# Patient Record
Sex: FEMALE | Race: WHITE | NOT HISPANIC OR LATINO | Employment: FULL TIME | URBAN - METROPOLITAN AREA
[De-identification: names, ages, dates, MRNs, and addresses within clinical notes are randomized per-mention and may not be internally consistent; named-entity substitution may affect disease eponyms.]

---

## 2023-01-11 LAB
ABO (EXTERNAL): NORMAL
HEPATITIS B SURFACE ANTIGEN (EXTERNAL): NEGATIVE
HIV1+2 AB SERPL QL IA: NEGATIVE
PLATELET COUNT (EXTERNAL): 271 10E3/UL (ref 150–450)
RH (EXTERNAL): POSITIVE
RUBELLA ANTIBODY IGG (EXTERNAL): NORMAL
VDRL (SYPHILIS) (EXTERNAL): NONREACTIVE

## 2023-08-01 LAB — GROUP B STREPTOCOCCUS (EXTERNAL): NEGATIVE

## 2023-08-18 ENCOUNTER — HOSPITAL ENCOUNTER (INPATIENT)
Facility: CLINIC | Age: 33
LOS: 1 days | Discharge: HOME HEALTH | End: 2023-08-20
Attending: SPECIALIST | Admitting: SPECIALIST
Payer: COMMERCIAL

## 2023-08-18 RX ORDER — VITAMIN A ACETATE, .BETA.-CAROTENE, ASCORBIC ACID, CHOLECALCIFEROL, .ALPHA.-TOCOPHEROL ACETATE, DL-, THIAMINE MONONITRATE, RIBOFLAVIN, NIACINAMIDE, PYRIDOXINE HYDROCHLORIDE, FOLIC ACID, CYANOCOBALAMIN, CALCIUM CARBONATE, FERROUS FUMARATE, ZINC OXIDE, AND CUPRIC OXIDE 2000; 2000; 120; 400; 22; 1.84; 3; 20; 10; 1; 12; 200; 27; 25; 2 [IU]/1; [IU]/1; MG/1; [IU]/1; MG/1; MG/1; MG/1; MG/1; MG/1; MG/1; UG/1; MG/1; MG/1; MG/1; MG/1
1 TABLET ORAL DAILY
COMMUNITY

## 2023-08-18 ASSESSMENT — ACTIVITIES OF DAILY LIVING (ADL)
DOING_ERRANDS_INDEPENDENTLY_DIFFICULTY: NO
TOILETING_ISSUES: NO
CONCENTRATING,_REMEMBERING_OR_MAKING_DECISIONS_DIFFICULTY: NO
CHANGE_IN_FUNCTIONAL_STATUS_SINCE_ONSET_OF_CURRENT_ILLNESS/INJURY: NO
FALL_HISTORY_WITHIN_LAST_SIX_MONTHS: NO
WEAR_GLASSES_OR_BLIND: YES
DIFFICULTY_EATING/SWALLOWING: NO
DRESSING/BATHING_DIFFICULTY: NO
WALKING_OR_CLIMBING_STAIRS_DIFFICULTY: NO

## 2023-08-19 ENCOUNTER — ANESTHESIA (OUTPATIENT)
Dept: OBGYN | Facility: CLINIC | Age: 33
End: 2023-08-19
Payer: COMMERCIAL

## 2023-08-19 ENCOUNTER — ANESTHESIA EVENT (OUTPATIENT)
Dept: OBGYN | Facility: CLINIC | Age: 33
End: 2023-08-19
Payer: COMMERCIAL

## 2023-08-19 PROBLEM — O36.63X0 FETAL MACROSOMIA DURING PREGNANCY IN THIRD TRIMESTER: Status: ACTIVE | Noted: 2023-08-19

## 2023-08-19 LAB
ABO/RH(D): NORMAL
ANTIBODY SCREEN: NEGATIVE
HGB BLD-MCNC: 10.8 G/DL (ref 11.7–15.7)
HOLD SPECIMEN: NORMAL
HOLD SPECIMEN: NORMAL
SPECIMEN EXPIRATION DATE: NORMAL
T PALLIDUM AB SER QL: NONREACTIVE

## 2023-08-19 PROCEDURE — 86780 TREPONEMA PALLIDUM: CPT | Performed by: SPECIALIST

## 2023-08-19 PROCEDURE — 85018 HEMOGLOBIN: CPT | Performed by: SPECIALIST

## 2023-08-19 PROCEDURE — 36415 COLL VENOUS BLD VENIPUNCTURE: CPT | Performed by: SPECIALIST

## 2023-08-19 PROCEDURE — 86850 RBC ANTIBODY SCREEN: CPT | Performed by: SPECIALIST

## 2023-08-19 PROCEDURE — 0KQM0ZZ REPAIR PERINEUM MUSCLE, OPEN APPROACH: ICD-10-PCS | Performed by: SPECIALIST

## 2023-08-19 PROCEDURE — 0UQMXZZ REPAIR VULVA, EXTERNAL APPROACH: ICD-10-PCS | Performed by: SPECIALIST

## 2023-08-19 PROCEDURE — 00HU33Z INSERTION OF INFUSION DEVICE INTO SPINAL CANAL, PERCUTANEOUS APPROACH: ICD-10-PCS | Performed by: ANESTHESIOLOGY

## 2023-08-19 PROCEDURE — 250N000011 HC RX IP 250 OP 636: Performed by: ANESTHESIOLOGY

## 2023-08-19 PROCEDURE — 258N000003 HC RX IP 258 OP 636: Performed by: SPECIALIST

## 2023-08-19 PROCEDURE — 250N000013 HC RX MED GY IP 250 OP 250 PS 637: Performed by: SPECIALIST

## 2023-08-19 PROCEDURE — 370N000003 HC ANESTHESIA WARD SERVICE: Performed by: ANESTHESIOLOGY

## 2023-08-19 PROCEDURE — 3E0R3BZ INTRODUCTION OF ANESTHETIC AGENT INTO SPINAL CANAL, PERCUTANEOUS APPROACH: ICD-10-PCS | Performed by: ANESTHESIOLOGY

## 2023-08-19 PROCEDURE — 250N000011 HC RX IP 250 OP 636: Mod: JZ | Performed by: SPECIALIST

## 2023-08-19 PROCEDURE — 250N000009 HC RX 250: Performed by: SPECIALIST

## 2023-08-19 PROCEDURE — 722N000001 HC LABOR CARE VAGINAL DELIVERY SINGLE

## 2023-08-19 PROCEDURE — 250N000011 HC RX IP 250 OP 636: Mod: JZ | Performed by: ANESTHESIOLOGY

## 2023-08-19 PROCEDURE — 120N000012 HC R&B POSTPARTUM

## 2023-08-19 RX ORDER — OXYTOCIN 10 [USP'U]/ML
10 INJECTION, SOLUTION INTRAMUSCULAR; INTRAVENOUS
Status: DISCONTINUED | OUTPATIENT
Start: 2023-08-19 | End: 2023-08-19 | Stop reason: HOSPADM

## 2023-08-19 RX ORDER — METOCLOPRAMIDE 10 MG/1
10 TABLET ORAL EVERY 6 HOURS PRN
Status: DISCONTINUED | OUTPATIENT
Start: 2023-08-19 | End: 2023-08-19 | Stop reason: HOSPADM

## 2023-08-19 RX ORDER — KETOROLAC TROMETHAMINE 30 MG/ML
30 INJECTION, SOLUTION INTRAMUSCULAR; INTRAVENOUS
Status: DISCONTINUED | OUTPATIENT
Start: 2023-08-19 | End: 2023-08-19 | Stop reason: ALTCHOICE

## 2023-08-19 RX ORDER — FENTANYL CITRATE-0.9 % NACL/PF 10 MCG/ML
100 PLASTIC BAG, INJECTION (ML) INTRAVENOUS EVERY 5 MIN PRN
Status: DISCONTINUED | OUTPATIENT
Start: 2023-08-19 | End: 2023-08-19 | Stop reason: HOSPADM

## 2023-08-19 RX ORDER — IBUPROFEN 400 MG/1
800 TABLET, FILM COATED ORAL
Status: DISCONTINUED | OUTPATIENT
Start: 2023-08-19 | End: 2023-08-19 | Stop reason: ALTCHOICE

## 2023-08-19 RX ORDER — MISOPROSTOL 200 UG/1
800 TABLET ORAL
Status: DISCONTINUED | OUTPATIENT
Start: 2023-08-19 | End: 2023-08-20 | Stop reason: HOSPADM

## 2023-08-19 RX ORDER — LIDOCAINE 40 MG/G
CREAM TOPICAL
Status: DISCONTINUED | OUTPATIENT
Start: 2023-08-19 | End: 2023-08-19 | Stop reason: HOSPADM

## 2023-08-19 RX ORDER — SODIUM CHLORIDE, SODIUM LACTATE, POTASSIUM CHLORIDE, CALCIUM CHLORIDE 600; 310; 30; 20 MG/100ML; MG/100ML; MG/100ML; MG/100ML
INJECTION, SOLUTION INTRAVENOUS CONTINUOUS
Status: DISCONTINUED | OUTPATIENT
Start: 2023-08-19 | End: 2023-08-19 | Stop reason: HOSPADM

## 2023-08-19 RX ORDER — NALOXONE HYDROCHLORIDE 0.4 MG/ML
0.2 INJECTION, SOLUTION INTRAMUSCULAR; INTRAVENOUS; SUBCUTANEOUS
Status: DISCONTINUED | OUTPATIENT
Start: 2023-08-19 | End: 2023-08-19 | Stop reason: HOSPADM

## 2023-08-19 RX ORDER — OXYTOCIN/0.9 % SODIUM CHLORIDE 30/500 ML
100-340 PLASTIC BAG, INJECTION (ML) INTRAVENOUS CONTINUOUS PRN
Status: DISCONTINUED | OUTPATIENT
Start: 2023-08-19 | End: 2023-08-20 | Stop reason: HOSPADM

## 2023-08-19 RX ORDER — CARBOPROST TROMETHAMINE 250 UG/ML
250 INJECTION, SOLUTION INTRAMUSCULAR
Status: DISCONTINUED | OUTPATIENT
Start: 2023-08-19 | End: 2023-08-19 | Stop reason: HOSPADM

## 2023-08-19 RX ORDER — BISACODYL 10 MG
10 SUPPOSITORY, RECTAL RECTAL DAILY PRN
Status: DISCONTINUED | OUTPATIENT
Start: 2023-08-19 | End: 2023-08-20 | Stop reason: HOSPADM

## 2023-08-19 RX ORDER — METOCLOPRAMIDE HYDROCHLORIDE 5 MG/ML
10 INJECTION INTRAMUSCULAR; INTRAVENOUS EVERY 6 HOURS PRN
Status: DISCONTINUED | OUTPATIENT
Start: 2023-08-19 | End: 2023-08-19 | Stop reason: HOSPADM

## 2023-08-19 RX ORDER — METHYLERGONOVINE MALEATE 0.2 MG/ML
200 INJECTION INTRAVENOUS
Status: DISCONTINUED | OUTPATIENT
Start: 2023-08-19 | End: 2023-08-19 | Stop reason: HOSPADM

## 2023-08-19 RX ORDER — OXYTOCIN 10 [USP'U]/ML
10 INJECTION, SOLUTION INTRAMUSCULAR; INTRAVENOUS
Status: DISCONTINUED | OUTPATIENT
Start: 2023-08-19 | End: 2023-08-20 | Stop reason: HOSPADM

## 2023-08-19 RX ORDER — PROCHLORPERAZINE MALEATE 5 MG
10 TABLET ORAL EVERY 6 HOURS PRN
Status: DISCONTINUED | OUTPATIENT
Start: 2023-08-19 | End: 2023-08-19 | Stop reason: HOSPADM

## 2023-08-19 RX ORDER — NALBUPHINE HYDROCHLORIDE 20 MG/ML
2.5-5 INJECTION, SOLUTION INTRAMUSCULAR; INTRAVENOUS; SUBCUTANEOUS EVERY 6 HOURS PRN
Status: DISCONTINUED | OUTPATIENT
Start: 2023-08-19 | End: 2023-08-20 | Stop reason: HOSPADM

## 2023-08-19 RX ORDER — ROPIVACAINE HYDROCHLORIDE 2 MG/ML
10 INJECTION, SOLUTION EPIDURAL; INFILTRATION; PERINEURAL ONCE
Status: DISCONTINUED | OUTPATIENT
Start: 2023-08-19 | End: 2023-08-19 | Stop reason: HOSPADM

## 2023-08-19 RX ORDER — TRANEXAMIC ACID 10 MG/ML
1 INJECTION, SOLUTION INTRAVENOUS EVERY 30 MIN PRN
Status: DISCONTINUED | OUTPATIENT
Start: 2023-08-19 | End: 2023-08-20 | Stop reason: HOSPADM

## 2023-08-19 RX ORDER — MODIFIED LANOLIN
OINTMENT (GRAM) TOPICAL
Status: DISCONTINUED | OUTPATIENT
Start: 2023-08-19 | End: 2023-08-20 | Stop reason: HOSPADM

## 2023-08-19 RX ORDER — OXYTOCIN/0.9 % SODIUM CHLORIDE 30/500 ML
340 PLASTIC BAG, INJECTION (ML) INTRAVENOUS CONTINUOUS PRN
Status: DISCONTINUED | OUTPATIENT
Start: 2023-08-19 | End: 2023-08-20 | Stop reason: HOSPADM

## 2023-08-19 RX ORDER — NALOXONE HYDROCHLORIDE 0.4 MG/ML
0.4 INJECTION, SOLUTION INTRAMUSCULAR; INTRAVENOUS; SUBCUTANEOUS
Status: DISCONTINUED | OUTPATIENT
Start: 2023-08-19 | End: 2023-08-19 | Stop reason: HOSPADM

## 2023-08-19 RX ORDER — MISOPROSTOL 200 UG/1
400 TABLET ORAL
Status: DISCONTINUED | OUTPATIENT
Start: 2023-08-19 | End: 2023-08-20 | Stop reason: HOSPADM

## 2023-08-19 RX ORDER — CARBOPROST TROMETHAMINE 250 UG/ML
250 INJECTION, SOLUTION INTRAMUSCULAR
Status: DISCONTINUED | OUTPATIENT
Start: 2023-08-19 | End: 2023-08-20 | Stop reason: HOSPADM

## 2023-08-19 RX ORDER — PROCHLORPERAZINE 25 MG
25 SUPPOSITORY, RECTAL RECTAL EVERY 12 HOURS PRN
Status: DISCONTINUED | OUTPATIENT
Start: 2023-08-19 | End: 2023-08-19 | Stop reason: HOSPADM

## 2023-08-19 RX ORDER — ONDANSETRON 2 MG/ML
4 INJECTION INTRAMUSCULAR; INTRAVENOUS EVERY 6 HOURS PRN
Status: DISCONTINUED | OUTPATIENT
Start: 2023-08-19 | End: 2023-08-19 | Stop reason: HOSPADM

## 2023-08-19 RX ORDER — MISOPROSTOL 200 UG/1
800 TABLET ORAL
Status: DISCONTINUED | OUTPATIENT
Start: 2023-08-19 | End: 2023-08-19 | Stop reason: HOSPADM

## 2023-08-19 RX ORDER — CITRIC ACID/SODIUM CITRATE 334-500MG
30 SOLUTION, ORAL ORAL
Status: DISCONTINUED | OUTPATIENT
Start: 2023-08-19 | End: 2023-08-19 | Stop reason: HOSPADM

## 2023-08-19 RX ORDER — ONDANSETRON 2 MG/ML
4 INJECTION INTRAMUSCULAR; INTRAVENOUS EVERY 6 HOURS PRN
Status: DISCONTINUED | OUTPATIENT
Start: 2023-08-19 | End: 2023-08-19

## 2023-08-19 RX ORDER — DOCUSATE SODIUM 100 MG/1
100 CAPSULE, LIQUID FILLED ORAL DAILY
Status: DISCONTINUED | OUTPATIENT
Start: 2023-08-19 | End: 2023-08-20 | Stop reason: HOSPADM

## 2023-08-19 RX ORDER — METHYLERGONOVINE MALEATE 0.2 MG/ML
200 INJECTION INTRAVENOUS
Status: DISCONTINUED | OUTPATIENT
Start: 2023-08-19 | End: 2023-08-20 | Stop reason: HOSPADM

## 2023-08-19 RX ORDER — ROPIVACAINE HYDROCHLORIDE 2 MG/ML
INJECTION, SOLUTION EPIDURAL; INFILTRATION; PERINEURAL
Status: COMPLETED | OUTPATIENT
Start: 2023-08-19 | End: 2023-08-19

## 2023-08-19 RX ORDER — IBUPROFEN 400 MG/1
800 TABLET, FILM COATED ORAL EVERY 6 HOURS PRN
Status: DISCONTINUED | OUTPATIENT
Start: 2023-08-19 | End: 2023-08-20 | Stop reason: HOSPADM

## 2023-08-19 RX ORDER — HYDROCORTISONE 25 MG/G
CREAM TOPICAL 3 TIMES DAILY PRN
Status: DISCONTINUED | OUTPATIENT
Start: 2023-08-19 | End: 2023-08-20 | Stop reason: HOSPADM

## 2023-08-19 RX ORDER — ACETAMINOPHEN 325 MG/1
650 TABLET ORAL EVERY 4 HOURS PRN
Status: DISCONTINUED | OUTPATIENT
Start: 2023-08-19 | End: 2023-08-19 | Stop reason: HOSPADM

## 2023-08-19 RX ORDER — FENTANYL CITRATE 50 UG/ML
50 INJECTION, SOLUTION INTRAMUSCULAR; INTRAVENOUS EVERY 30 MIN PRN
Status: DISCONTINUED | OUTPATIENT
Start: 2023-08-19 | End: 2023-08-19 | Stop reason: HOSPADM

## 2023-08-19 RX ORDER — ACETAMINOPHEN 325 MG/1
650 TABLET ORAL EVERY 4 HOURS PRN
Status: DISCONTINUED | OUTPATIENT
Start: 2023-08-19 | End: 2023-08-20 | Stop reason: HOSPADM

## 2023-08-19 RX ORDER — OXYTOCIN/0.9 % SODIUM CHLORIDE 30/500 ML
340 PLASTIC BAG, INJECTION (ML) INTRAVENOUS CONTINUOUS PRN
Status: DISCONTINUED | OUTPATIENT
Start: 2023-08-19 | End: 2023-08-19 | Stop reason: HOSPADM

## 2023-08-19 RX ORDER — TRANEXAMIC ACID 10 MG/ML
1 INJECTION, SOLUTION INTRAVENOUS EVERY 30 MIN PRN
Status: DISCONTINUED | OUTPATIENT
Start: 2023-08-19 | End: 2023-08-19 | Stop reason: HOSPADM

## 2023-08-19 RX ORDER — ONDANSETRON 4 MG/1
4 TABLET, ORALLY DISINTEGRATING ORAL EVERY 6 HOURS PRN
Status: DISCONTINUED | OUTPATIENT
Start: 2023-08-19 | End: 2023-08-19 | Stop reason: HOSPADM

## 2023-08-19 RX ORDER — MISOPROSTOL 200 UG/1
400 TABLET ORAL
Status: DISCONTINUED | OUTPATIENT
Start: 2023-08-19 | End: 2023-08-19 | Stop reason: HOSPADM

## 2023-08-19 RX ADMIN — IBUPROFEN 800 MG: 400 TABLET ORAL at 14:05

## 2023-08-19 RX ADMIN — ACETAMINOPHEN 650 MG: 325 TABLET, FILM COATED ORAL at 14:04

## 2023-08-19 RX ADMIN — IBUPROFEN 800 MG: 400 TABLET ORAL at 07:59

## 2023-08-19 RX ADMIN — MISOPROSTOL 800 MCG: 200 TABLET ORAL at 06:16

## 2023-08-19 RX ADMIN — SODIUM CHLORIDE, POTASSIUM CHLORIDE, SODIUM LACTATE AND CALCIUM CHLORIDE: 600; 310; 30; 20 INJECTION, SOLUTION INTRAVENOUS at 01:30

## 2023-08-19 RX ADMIN — ROPIVACAINE HYDROCHLORIDE 8 ML: 2 INJECTION, SOLUTION EPIDURAL; INFILTRATION at 01:31

## 2023-08-19 RX ADMIN — ACETAMINOPHEN 650 MG: 325 TABLET, FILM COATED ORAL at 07:59

## 2023-08-19 RX ADMIN — METOCLOPRAMIDE 10 MG: 5 INJECTION, SOLUTION INTRAMUSCULAR; INTRAVENOUS at 00:26

## 2023-08-19 RX ADMIN — ACETAMINOPHEN 650 MG: 325 TABLET, FILM COATED ORAL at 20:43

## 2023-08-19 RX ADMIN — DOCUSATE SODIUM 100 MG: 100 CAPSULE, LIQUID FILLED ORAL at 08:00

## 2023-08-19 RX ADMIN — Medication: at 01:33

## 2023-08-19 RX ADMIN — Medication 340 ML/HR: at 06:12

## 2023-08-19 RX ADMIN — BENZOCAINE: 11.4 AEROSOL, SPRAY TOPICAL at 17:33

## 2023-08-19 RX ADMIN — IBUPROFEN 800 MG: 400 TABLET ORAL at 20:43

## 2023-08-19 ASSESSMENT — ACTIVITIES OF DAILY LIVING (ADL)
ADLS_ACUITY_SCORE: 24
ADLS_ACUITY_SCORE: 20
ADLS_ACUITY_SCORE: 24
ADLS_ACUITY_SCORE: 20
ADLS_ACUITY_SCORE: 20

## 2023-08-19 NOTE — H&P
"OB Admit    33 yo G1 female currently at 39+3/7 weeks EDC 23 who presents in active labor.  She began benigno around 8 pm and then SROM at 11 pm for clear fluid.  GBS neg  Her contractions then became very intense and she presented to the [facility].  Her exam was 80/-1.  Her last exam in clinic four days ago was 60/-2.  She just requested and received an epidural and is still getting comfortable.  She is shaky.     Preg risks  1) anxiety with a history of meds in the past, not current    2) suspected LGA baby - recent ultrasound shows EFW 9.5# (pt and partner both >9# babies)      PNL    O pos  Rub imm  S/p Tdap  GBS neg    PMH H/o abnormal pap - no treatment   Eosinophilic esophagitis   Seasonal allergies  PSH None  Meds PNV   Unisom  All Augmentin - hives/itching/vomiting   Nuts   Eggs  Soc , nonsmoker    PE /80   Temp 97.6  F (36.4  C) (Temporal)   Resp 16   Ht 1.753 m (5' 9\")   Wt 81.2 kg (179 lb)   BMI 26.43 kg/m      Gen alert getting comfortable, shaky  Abd soft/NT/gravid/EFW 8.5# by Leopolds  Extr neg    FHT cat 1 tracing mod maine reactive  Eddystone q2 min  SVE 6-7/90/ant/soft/-1 to 0 station    Hemoglobin   Date Value Ref Range Status   2023 10.8 (L) 11.7 - 15.7 g/dL Final   ]  Imp: Nulliparous female at term with spontaneous onset of labor, making good progress.  GBS neg.  Cat 1 tracing.    Plan: CPM.  Consider pitocin augmentation if contractions space out or no change on next exam.  Anticipate .  Birth preferences reviewed.        "

## 2023-08-19 NOTE — PLAN OF CARE
Data: Patient presented to [facility]: 2023 11:39 PM.  Reason for maternal/fetal assessment is uterine contractions and leaking vaginal fluid. Patient reports ROM at 2300 and contractions all afternoon and evening. Patient denies headache, visual disturbances, epigastric or RUQ pain, significant edema. Patient reports fetal movement is normal. Patient is a 39w2d . Prenatal record reviewed. Pregnancy has been uncomplicated. Support person is present.     Fetal HR baseline was 130, variability is moderate. Accelerations: present. Decelerations: not present. Uterine assessment is strong during contractions and soft at rest. Cervix 4 cm dilated and 80% effaced. Fetal station -2. Fetal presentation cephalic per cervical exam. Membranes: fluid visually apparent externally.    Vital signs wnl. Patient reports pain and is coping.     Action: Verbal consent for EFM. Triage assessment completed.

## 2023-08-19 NOTE — ANESTHESIA PROCEDURE NOTES
"Epidural catheter Procedure Note    Pre-Procedure   Staff -        Anesthesiologist:  Cara Crump MD       Performed By: anesthesiologist       Location: OB       Pre-Anesthestic Checklist: patient identified, IV checked, risks and benefits discussed, informed consent, monitors and equipment checked, pre-op evaluation, at physician/surgeon's request and post-op pain management  Timeout:       Correct Patient: Yes        Correct Procedure: Yes        Correct Site: Yes        Correct Position: Yes   Procedure Documentation  Procedure: epidural catheter       Diagnosis: Labor       Patient Position: sitting       Skin prep: Chloraprep       Local skin infiltrated with 3 mL of 1% lidocaine.        Insertion Site: L3-4. (midline approach).       Technique: LORT saline and LORT air        FRANCISCO at 6 cm.       Needle Type: JobScouty needle       Needle Gauge: 17.        Needle Length (Inches): 3.5           Catheter threaded easily.           Threaded 12 cm at skin.         # of attempts: 1 and  # of redirects:  0    Assessment/Narrative         Paresthesias: No.       Test dose of 4 mL lidocaine 1.5% w/ 1:200,000 epinephrine at.         Test dose negative, 3 minutes after injection, for signs of intravascular, subdural, or intrathecal injection.       Insertion/Infusion Method: LORT saline and LORT air       Aspiration negative for Heme or CSF via Epidural Catheter.    Medication(s) Administered   0.2% Ropivacaine (Epidural) - EPIDURAL   8 mL - 8/19/2023 1:31:00 AM    FOR [facility] ([facility]) ONLY:   Pain Team Contact information: please page the Pain Team Via [employer]. Search \"Pain\". During daytime hours, please page the attending first. At night please page the resident first.      "

## 2023-08-19 NOTE — PROVIDER NOTIFICATION
08/18/23 3303   Provider Notification   Provider Name/Title Dr. Douglas   Method of Notification Phone   Request Evaluate - Remote   Notification Reason Patient Arrived;Membrane Status;Labor Status;SVE     Spoke with Dr. Douglas on phone regarding but not limited to patients arrival, SVE, pain management desires of patient. Telephone orders received for admission and that patient may have whatever she desires for pain. Orders placed by this RN. ARMANI

## 2023-08-19 NOTE — PLAN OF CARE
Vital signs stable. Postpartum assessment WDL.perineum sore  benzocaine spray given Pain controlled with tylenol and motrin . Patient up ambulating voiding without difficulty. Working on Breastfeeding  Patient and infant bonding well. Will continue with current plan of care.

## 2023-08-19 NOTE — ANESTHESIA PREPROCEDURE EVALUATION
Anesthesia Pre-Procedure Evaluation    Patient: Viola Berry   MRN: [id number] : 1990        Procedure :           History reviewed. No pertinent past medical history.   History reviewed. No pertinent surgical history.   Allergies   Allergen Reactions    Amoxicillin       Social History     Tobacco Use    Smoking status: Never    Smokeless tobacco: Never   Substance Use Topics    Alcohol use: Not Currently      Wt Readings from Last 1 Encounters:   23 81.2 kg (179 lb)        Anesthesia Evaluation            ROS/MED HX  ENT/Pulmonary:    (-) asthma   Neurologic:       Cardiovascular:    (-) PIH   METS/Exercise Tolerance:     Hematologic:    (-) anemia   Musculoskeletal:       GI/Hepatic:    (-) GERD   Renal/Genitourinary:       Endo:    (-) obesity   Psychiatric/Substance Use:       Infectious Disease:       Malignancy:       Other:            Physical Exam    Airway        Mallampati: I   TM distance: > 3 FB   Neck ROM: full   Mouth opening: > 3 cm    Respiratory Devices and Support         Dental  no notable dental history         Cardiovascular   cardiovascular exam normal          Pulmonary   pulmonary exam normal                OUTSIDE LABS:  CBC:   Lab Results   Component Value Date    HGB 10.8 (L) 2023     BMP: No results found for: NA, POTASSIUM, CHLORIDE, CO2, BUN, CR, GLC  COAGS: No results found for: PTT, INR, FIBR  POC: No results found for: BGM, HCG, HCGS  HEPATIC: No results found for: ALBUMIN, PROTTOTAL, ALT, AST, GGT, ALKPHOS, BILITOTAL, BILIDIRECT, MARGARETTE  OTHER: No results found for: PH, LACT, A1C, DAYNA, PHOS, MAG, LIPASE, AMYLASE, TSH, T4, T3, CRP, SED    Anesthesia Plan    ASA Status:  1       Anesthesia Type: Epidural.              Consents    Anesthesia Plan(s) and associated risks, benefits, and realistic alternatives discussed. Questions answered and patient/representative(s) expressed understanding.     - Discussed:     - Discussed with:  Patient, Spouse             Postoperative Care            Comments:                Cara Crump MD

## 2023-08-19 NOTE — PLAN OF CARE
Data: [facility] at 0900. Baby transferred via parent's arms.  Action: Receiving unit notified of transfer: Yes. Patient and family notified of room change. Report given to Winter TOWNSEND RN at 0900. Belongings sent to receiving unit. Accompanied by Registered Nurse. Oriented patient to surroundings. Call light within reach. ID bands double-checked with receiving RN.  Response: Patient tolerated transfer and is stable.

## 2023-08-19 NOTE — PLAN OF CARE
Patient transferred from labor and delivery at 0900 Report taken from Slime MALONE. Safety and security, and education reviewed. Baby bands checked. VSS. Fundus firm and midline. Scant flow. pain controlled with tylenol and ibuprofen  Breastfeeding.Encouraged to call with needs, questions, or concerns. Will continue to monitor.

## 2023-08-19 NOTE — PROGRESS NOTES
Patient moved to room 231 via wheelchair. Report given to Em RN.   Pt resting comfortably on stretcher. Additional blankets given to pt. Awaiting for ERP to re-eval pt.

## 2023-08-19 NOTE — L&D DELIVERY NOTE
OB Vaginal Delivery Note    Viola Berry MRN# [id number]   Age: 32 year old YOB: 1990       GA: 39w3d  GP:   Labor Complications: None   EBL: 600  mL  Delivery QBL: 600 mL  Delivery Type: Vaginal, Spontaneous   ROM to Delivery Time: (Delivered) Hours: 7 Minutes: 8  [place]ger Weight:     1 Minute 5 Minute 10 Minute   Apgar Totals: 7   8             Delivery Details:  Viola Berry, a 32 year old  female delivered a viable infant with apgars of 7  and 8 . Patient was fully dilated and pushing after 6  hours  in active labor. She began having contraction at home which intensified after SROM for clear fluid at home.  She presented shortly thereafter and was 4 cm.  She requested and received an epidural.  Cat 1 tracing throughout first stage.  Pushed very effectively x 1 hour.  Fetal tachycardia noted in the last 20 minutes maintaining moderate variability.  Delivery was via vaginal, spontaneous  to a sterile field under epidural  anesthesia. Male infant delivered in vertex  right  occiput  anterior  position. Loose nuchal noted and delivered through.  Anterior and posterior shoulders delivered without difficulty. The cord was clamped, cut twice and 3 vessels  were noted. Cord blood was obtained in routine fashion with the following disposition: lab .      Cord complications: nuchal   Placenta delivered at 2023  6:15 AM . Placental disposition was Hospital disposal . Fundal massage performed and fundus found to be firm.     Episiotomy: none    Perineum, vagina, cervix were inspected, and the following lacerations were noted:   Perineal lacerations: 2nd                Perineal laceration was repaired in the usual fashion using 3-0 chromic.  Midline periurethral laceration was repaired with 4-0 vicryl.    NNP called after delivery due to nasal congestion of infant which responded to suction.    Excellent hemostasis was noted. Needle count correct. Infant and patient in  delivery room in good and stable condition.        Jax Berry [[id number]]      Labor Event Times      Dilation complete date: 23 Complete time:  4:53 AM   Start pushing date/time: 2023 0510          Labor Events     labor?: No   steroids: None  Labor Type: Spontaneous  Predominate monitoring during 1st stage: continuous electronic fetal monitoring     Rupture identifier: Sac 1  Rupture date/time: 23 2300   Rupture type: Spontaneous Rupture of Membranes  Fluid color: Clear, Bloody  Fluid odor: Normal     Augmentation: None       Delivery/Placenta Date and Time      Delivery Date: 23 Delivery Time:  6:08 AM   Placenta Date/Time: 2023  6:15 AM  Oxytocin given at the time of delivery: after delivery of baby  Delivering clinician: Kasey Douglas MD              Vaginal Counts       Initial count performed by 2 team members:  Two Team Members   Stella Perez RN         Lisle Suture Needles Sponges (RETIRED) Instruments   Initial counts 2 0 5    Added to count 0 2 0    Relief counts 0 0 0    Final counts 2 2 5            Placed during labor Accounted for at the end of labor   FSE NA NA   IUPC NA NA   Cervidil NA NA                  Final count performed by 2 team members:  Two Team Members   Stella Perez RN      Final count correct?: Yes  Pre-Birth Team Brief: Complete  Post-Birth Team Debrief: Complete       Apgars    Living status: Living   1 Minute 5 Minute 10 Minute 15 Minute 20 Minute   Skin color: 0  1       Heart rate: 2  2       Reflex irritability: 2  2       Muscle tone: 2  2       Respiratory effort: 1  1       Total: 7  8       Apgars assigned by: SONG MCINTYRE RN       Cord      Vessels: 3 Vessels    Cord Complications: Nuchal   Nuchal Intervention: delivered through         Nuchal cord description: loose nuchal cord         Cord Blood Disposition: Lab    Gases Sent?: No    Delayed cord clamping?: Yes    Cord Clamping Delay (seconds):   seconds    Stem cell collection?: No           Roland Resuscitation    Methods: None, Oximetry  Roland Care at Delivery: Called after delivery due to increased work of breathing due to upper airway congestion. Upon arrival infant pink and well perfused on radiant warmer, loud nasal congestion noted with mild subcostal and supraclavicular retractions. Oxygen saturations >95% in room air. Strong lusty cry, breath sounds clear and equal. 8Fr suction catheter passed down each nares retrieving small amount of clear secretions. Deep suctioned for small amount of tenacious secretions. Nasal congestion improved after suction but noted to snort when infant upset. Would avoid nasal suction to try to clear any further stuffiness unless absolutely necessary so as to not worsen any narrowing with swelling. KORY Wills, CNP-BC 2023 6:34 AM         Labor Events and Shoulder Dystocia    Fetal Tracing Prior to Delivery: Category 2  Fetal Tracing Comments: Category 1 throughout first stage of labor, then cat 2 the last 20 minutes of the second stage fetal tachycardia 170s  Shoulder dystocia present?: Neg       Delivery (Maternal) (Provider to Complete) ([id number])    Episiotomy: None  Perineal lacerations: 2nd    Est. blood loss (mL): 600  Repair suture: 4-0 Vicryl, 3-0 Chromic  Genital tract inspection done: Pos       Blood Loss  Mother: Viola Berry #[id number]     Start of Mother's Information      Delivery Blood Loss  23 1808 - 23 0645      EBL (mL) Hospital Encounter 600 mL    Delivery QBL (mL) Hospital Encounter 600 mL    Total  1200 mL               End of Mother's Information  Mother: Viola Berry #[id number]                Delivery - Provider to Complete (202226)    Delivering clinician: Kasey Douglas MD  Delivery Type (Choose the 1 that will go to the Birth History): Vaginal, Spontaneous                                           Placenta     Date/Time: 8/19/2023  6:15 AM  Removal: Spontaneous  Disposition: Hospital disposal             Anesthesia    Method: Epidural  Cervical dilation at placement: 4-7                    Presentation and Position    Presentation: Vertex    Position: Right Occiput Anterior                     Kasey Douglas MD

## 2023-08-20 VITALS
RESPIRATION RATE: 18 BRPM | BODY MASS INDEX: 25.52 KG/M2 | DIASTOLIC BLOOD PRESSURE: 68 MMHG | HEART RATE: 70 BPM | WEIGHT: 172.3 LBS | SYSTOLIC BLOOD PRESSURE: 107 MMHG | OXYGEN SATURATION: 100 % | TEMPERATURE: 97.6 F | HEIGHT: 69 IN

## 2023-08-20 LAB — HGB BLD-MCNC: 8.9 G/DL (ref 11.7–15.7)

## 2023-08-20 PROCEDURE — 36415 COLL VENOUS BLD VENIPUNCTURE: CPT | Performed by: SPECIALIST

## 2023-08-20 PROCEDURE — 250N000013 HC RX MED GY IP 250 OP 250 PS 637: Performed by: SPECIALIST

## 2023-08-20 PROCEDURE — 85018 HEMOGLOBIN: CPT | Performed by: SPECIALIST

## 2023-08-20 RX ORDER — IBUPROFEN 200 MG
600 TABLET ORAL EVERY 6 HOURS PRN
COMMUNITY
Start: 2023-08-20

## 2023-08-20 RX ORDER — ACETAMINOPHEN 325 MG/1
650 TABLET ORAL EVERY 4 HOURS PRN
COMMUNITY
Start: 2023-08-20

## 2023-08-20 RX ADMIN — IBUPROFEN 800 MG: 400 TABLET ORAL at 07:02

## 2023-08-20 RX ADMIN — ACETAMINOPHEN 650 MG: 325 TABLET, FILM COATED ORAL at 13:27

## 2023-08-20 RX ADMIN — ACETAMINOPHEN 650 MG: 325 TABLET, FILM COATED ORAL at 07:02

## 2023-08-20 RX ADMIN — IBUPROFEN 800 MG: 400 TABLET ORAL at 13:27

## 2023-08-20 RX ADMIN — DOCUSATE SODIUM 100 MG: 100 CAPSULE, LIQUID FILLED ORAL at 08:12

## 2023-08-20 ASSESSMENT — ACTIVITIES OF DAILY LIVING (ADL)
ADLS_ACUITY_SCORE: 20

## 2023-08-20 NOTE — LACTATION NOTE
"Lactation visit with Viola, FOREYNALDO, and baby Anthony.    Viola has sore nipples and was given a shield overnight to try but shares with  this morning that baby Anthony didn't like the shield. Anthony is LGA, 24 hour sugar was WNL and he has been cluster-feeding. Upon observation of Viola's nipples, she has a red line through the middle of each nipple, it's more prominent on her L. Explained this shows  that infant has been compressing more nipple than breast. We were unable to stimulate infant to wakefulness during my visit. But we did practice cross cradle hold and techniques including nose to nipple alignment and how to support infant's shoulder blades and neck to allow flexion for optimal latch positioning. Offered to come back if infant presents more wakeful and assist with a feeding.    Hydrogels provided.    Infant to be circumcised today and family is also wanting to discharge. Encouraged Viola to follow up with outpatient lact[facility] at [facility] as well.    Parents educated on  breastfeeding basics:   1) Watch for early feeding cues (licking lips, stirring or rooting, sucking movement with mouth, hands to mouth).  2) Infant should breastfeed on demand and a minimum of 8 times in 24 hours. Offer to  breastfeed infant at least every 3 hours.   3) Techniques to waking a sleepy baby to nurse: un-swaddle infant, check infant's diaper, begin snuggling skin to skin. Additionally, mom can hand express colostrum, begin gentle stimulation including stroking infant's back and feet.    Reviewed breast feeding section in our \"Guide to Postpartum and [place] [facility].\" Highlighting page that educates to  feeding patterns/behavior. Day one \"normal sleepiness\" followed by a cluster feeding pattern on second day/night. Also reviewed feeding log in back of booklet, how to track and why tracking infant's feedings and wet/dirty diapers is important.     Discussed physiology of milk production from " "colostrum through milk \"coming in\". Typically women begin to feel changes to their breasts between day 3-5. Answered questions regarding \"how to know when infant is done at the breast\". Discussed normal infant weight loss and when infant should be back to birth weight. Stressed the importance of continuing to track infant's feeds and void/stools patterns, at least until infant has returned to his birth weight.    Discussed pumping (when it's helpful, when it's necessary, and when to start). Suggested pumping around infant's one month of age after first morning breast-feed for building milk storage). Viola has a new breast pump for home use.     Appreciative of visit.    Shruthi Bautista RN, IBCLC          "

## 2023-08-20 NOTE — DISCHARGE INSTRUCTIONS

## 2023-08-20 NOTE — PLAN OF CARE
D: VSS, assessments WDL.   I: Pt. received complete discharge paperwork and home medication  Pt. was given times of last dose for all discharge medications in writing on discharge medication sheets.  Discharge teaching included home medication, pain management, activity restrictions, postpartum cares, and signs and symptoms of infection.    A: Discharge outcomes on care plan met.  Mother states understanding and comfort with self care and follow up care.   P: Pt. Discharged.  Pt. was accompanied by Spouse  and left with personal belongings.   Pt. to follow up with OB provider per discharge instructions.  Pt. had no further questions at the time of discharge and no unmet needs were identified.

## 2023-08-20 NOTE — DISCHARGE SUMMARY
[facility] Discharge Summary    Viola Berry   Age: 32 year old MRN:[id number]  :1990         Date of Admission:  2023  Date of Discharge::  2023  Admitting Physician:   Kasey Douglas MD  Discharge Physician:  BROOKLYN FOOTE DO     Home clinic: [facility]          Admission Diagnoses:   Indication for care in labor or delivery [O75.9]          Discharge Diagnosis:     Normal spontaneous vaginal delivery          Procedures:     Procedure(s): No additional procedures performed       No other procedures performed during this admission           Medications Prior to Admission:     Medications Prior to Admission   Medication Sig Dispense Refill Last Dose    Prenatal Vit-Fe Fumarate-FA (PNV PRENATAL PLUS MULTIVITAMIN) 27-1 MG TABS per tablet Take 1 tablet by mouth daily   Past Week             Discharge Medications:     Current Discharge Medication List        START taking these medications    Details   acetaminophen (TYLENOL) 325 MG tablet Take 2 tablets (650 mg) by mouth every 4 hours as needed for mild pain or fever (greater than or equal to 38  C /100.4  F (oral) or 38.5  C/ 101.4  F (core).)    Associated Diagnoses: Spontaneous vaginal delivery      ibuprofen (ADVIL/MOTRIN) 200 MG tablet Take 3 tablets (600 mg) by mouth every 6 hours as needed for other (cramping)    Associated Diagnoses: Spontaneous vaginal delivery           CONTINUE these medications which have NOT CHANGED    Details   Prenatal Vit-Fe Fumarate-FA (PNV PRENATAL PLUS MULTIVITAMIN) 27-1 MG TABS per tablet Take 1 tablet by mouth daily                   Consultations:   No consultations were requested during this admission          Brief History of Labor:   Pt arrived in spont labor. Received epidural. Normal delivery.            Hospital Course:   The patient's hospital course was unremarkable.  On discharge, her pain was well controlled. Vaginal bleeding is similar to peak menstrual flow.   Voiding without difficulty.  Ambulating well and tolerating a normal diet.  No fever.  Working  on breast feeding.  Infant is stable.  No bowel movement yet.  She was discharged on post-partum day #1.    Post-partum hemoglobin:   Hemoglobin   Date Value Ref Range Status   08/19/2023 10.8 (L) 11.7 - 15.7 g/dL Final            Discharge Instructions and Follow-Up:     Discharge diet: Regular   Discharge activity: Nothing in Vagina for 6 weeks - nio intercourse, tampons.  Resume normal activities     Discharge follow-up: RTC Dr Douglas in 2 and 6 weeks for PP check           Discharge Disposition:     Discharged to home      Electronically signed by  Bessie Mccullough DO  [phone]  10:04 AM  8/20/2023  [facility]

## 2023-08-20 NOTE — PROGRESS NOTES
Postpartum Day 1: Vaginal Delivery    S:The patient feels well.  Sore and tired but okay. Pain is well controlled with current medications.  Bottom feels sore. She is  ambulating,  voiding without difficulty. The baby is well and she is working on breast feeding. Breast sore with lath, lactation coming in now to help.  Bleeding is  moderate. The patient has no emotional concerns. Denies headache, chest pain, shortness of breath, nausea or vomitting.Would like to go home today.       O:  Patient Vitals for the past 24 hrs:   BP Temp Temp src Pulse Resp Weight   23 0854 -- -- -- -- -- 78.2 kg (172 lb 4.8 oz)   23 0810 107/68 97.6  F (36.4  C) Oral 70 18 --   23 0546 110/72 98.4  F (36.9  C) Oral 68 18 --   23 0046 117/68 98.2  F (36.8  C) Oral 60 16 --   23 2046 114/71 98.1  F (36.7  C) Oral 66 18 --   23 1611 113/71 98.2  F (36.8  C) Oral 69 18 --   23 1257 110/71 98.4  F (36.9  C) Oral 63 18 --        Genl: AAOx3m NAD  Abd: Soft, NT, Fundus firm  Ext: NT, no edema    Impression:32 year old y.o.  PPD#1 s/p .  Normal postpartum course.     Plan: Continue current care.  Home later today if baby okay to go  Follow up 2,6 week    Electronically signed by  Bessie Mccullough DO  9:59 AM  2023  [facility]

## 2023-08-20 NOTE — PLAN OF CARE
Goal Outcome Evaluation:  VSS.  Pain well controlled, requesting prn pain medications as needed.  Up independently in room.  Working on breastfeeding. Fundus firm, midline at the umbilicus. Bleeding is moderate. All needs met, will Continue to monitor and notify MD as needed.

## 2023-08-20 NOTE — PLAN OF CARE
Vital signs stable.Hgb 8.9 today  Postpartum assessment WDL. Pain controlled with tylenol and motrin . Patient up ambulating voiding without difficulty. Breastfeeding well using hydrogel for sore nipples  Patient and infant bonding well.pt wants to discharge today discharge planning done   Will continue with current plan of care.

## 2023-08-21 NOTE — ANESTHESIA POSTPROCEDURE EVALUATION
Patient: Viola Berry    Procedure: * No procedures listed *       Anesthesia Type:  No value filed.    Note:  Disposition: Admission; Inpatient   Postop Pain Control: Uneventful            Sign Out: Well controlled pain   PONV: No   Neuro/Psych: Uneventful            Sign Out: Acceptable/Baseline neuro status   Airway/Respiratory: Uneventful            Sign Out: Acceptable/Baseline resp. status   CV/Hemodynamics: Uneventful            Sign Out: Acceptable CV status; No obvious hypovolemia; No obvious fluid overload   Other NRE: NONE   DID A NON-ROUTINE EVENT OCCUR? No    Event details/Postop Comments:  Patient discharged. Chart reviewed and assessed; no apparent complications identified.    Ruben Bailey DO             Last vitals:  Vitals:    08/20/23 0046 08/20/23 0546 08/20/23 0810   BP: 117/68 110/72 107/68   Pulse: 60 68 70   Resp: 16 18 18   Temp: 36.8  C (98.2  F) 36.9  C (98.4  F) 36.4  C (97.6  F)   SpO2:          Electronically Signed By: Ruben Bailey DO  August 20, 2023  8:14 PM

## 2023-08-22 ENCOUNTER — HOSPITAL ENCOUNTER (OUTPATIENT)
Facility: CLINIC | Age: 33
End: 2023-08-22
Attending: SPECIALIST | Admitting: SPECIALIST
Payer: COMMERCIAL

## 2023-10-22 ENCOUNTER — HEALTH MAINTENANCE LETTER (OUTPATIENT)
Age: 33
End: 2023-10-22

## 2024-12-15 ENCOUNTER — HEALTH MAINTENANCE LETTER (OUTPATIENT)
Age: 34
End: 2024-12-15